# Patient Record
Sex: FEMALE | Race: WHITE
[De-identification: names, ages, dates, MRNs, and addresses within clinical notes are randomized per-mention and may not be internally consistent; named-entity substitution may affect disease eponyms.]

---

## 2019-08-23 ENCOUNTER — HOSPITAL ENCOUNTER (OUTPATIENT)
Dept: HOSPITAL 11 - JP.SDS | Age: 69
Discharge: HOME | End: 2019-08-23
Attending: SURGERY
Payer: MEDICARE

## 2019-08-23 DIAGNOSIS — K57.30: ICD-10-CM

## 2019-08-23 DIAGNOSIS — D12.8: ICD-10-CM

## 2019-08-23 DIAGNOSIS — I10: ICD-10-CM

## 2019-08-23 DIAGNOSIS — F32.9: ICD-10-CM

## 2019-08-23 DIAGNOSIS — Z88.1: ICD-10-CM

## 2019-08-23 DIAGNOSIS — I47.1: ICD-10-CM

## 2019-08-23 DIAGNOSIS — Z12.11: Primary | ICD-10-CM

## 2019-08-23 DIAGNOSIS — Z86.010: ICD-10-CM

## 2019-08-23 DIAGNOSIS — Z80.0: ICD-10-CM

## 2019-08-23 DIAGNOSIS — K21.9: ICD-10-CM

## 2019-08-23 PROCEDURE — 45380 COLONOSCOPY AND BIOPSY: CPT

## 2019-08-23 NOTE — OR
DATE OF PROCEDURE:  08/23/2019

 

PREOPERATIVE DIAGNOSIS:  History of precancerous polyps, strong family history of colon

cancer, brother had colon cancer.

 

POSTOPERATIVE DIAGNOSES:  Small distal rectal polyp, diverticulosis, personal history of

precancerous polyps, brother had colon cancer.

 

PROCEDURE:  Colonoscopy to the cecum with biopsy resection of small distal rectal polyp.

 

SURGEON:  Sathya Nice MD

 

ANESTHESIA:  IV anesthesia with monitored anesthesia care.

 

INDICATION:  This 69-year-old white female is referred for a colonoscopy.  She has a

personal history of precancerous polyps.  She has a strong family history of colon cancer in

that her brother had colon cancer.  Her last colonoscopic exam she says was done 3 years

ago.  I counseled her for the procedure, including risks and alternatives, and she gave her

informed consent to proceed.

 

DESCRIPTION OF PROCEDURE:  The patient was placed in the left lateral decubitus position.

IV anesthesia was administered by the Anesthesia Service.  Time-out was held.  A rectal exam

was performed, which was unremarkable.  The flexible video Olympus colonoscope was

introduced through her anus, up her rectum and out her colon, all the way to the cecum.  En

route, we saw a few scattered left-sided diverticula.  There was no bleeding or inflammation

associated with them.  Once the cecum was reached, the scope was slowly withdrawn examining

the mucosa throughout.  No additional mucosal abnormalities were noted until we reached the

rectum.  Here, when we retroflexed the scope, in the distal rectum, we saw a small polyp.

This was removed with several bites of the biopsy forceps.  The scope was straightened and

removed.  She tolerated the procedure well.

 

 

 

 

Sathya Nice MD

DD:  08/23/2019 09:05:37

DT:  08/23/2019 10:54:50

Job #:  [id number]

## 2020-03-07 ENCOUNTER — HOSPITAL ENCOUNTER (EMERGENCY)
Dept: HOSPITAL 11 - JP.ED | Age: 70
Discharge: HOME | End: 2020-03-07
Payer: MEDICARE

## 2020-03-07 DIAGNOSIS — Z88.1: ICD-10-CM

## 2020-03-07 DIAGNOSIS — Z79.899: ICD-10-CM

## 2020-03-07 DIAGNOSIS — J44.9: ICD-10-CM

## 2020-03-07 DIAGNOSIS — M25.462: Primary | ICD-10-CM

## 2020-03-07 DIAGNOSIS — I10: ICD-10-CM

## 2020-03-07 NOTE — EDM.PDOC
ED HPI GENERAL MEDICAL PROBLEM





- General


Chief Complaint: Lower Extremity Injury/Pain


Stated Complaint: LEFT KNEE PAIN  DIFFICULT TO STAND


Time Seen by Provider: 03/07/20 10:20


Source of Information: Reports: Patient, Old Records, RN


History Limitations: Reports: No Limitations





- History of Present Illness


INITIAL COMMENTS - FREE TEXT/NARRATIVE: 





68 yo female presents with L knee pain and swelling. Problem ongoing for about 

a week. No injury. No fever. Had problems similarly about a year ago and was tx'

d in the clinic with a steroid injection. Has not been to the clinic lately. Is 

on acetaminophen and diclofenac currently for arthritis. 


Onset: Gradual


Onset Date: 02/29/20


Duration: Week(s): (1), Getting Worse


Location: Reports: Lower Extremity, Left


Quality: Reports: Dull


Severity: Moderate


Improves with: Reports: Rest


Worsens with: Reports: Movement


Context: Reports: Other (see HPI)


Associated Symptoms: Reports: No Other Symptoms


Treatments PTA: Reports: Acetaminophen, NSAIDS


  ** Left Knee


Pain Score (Numeric/FACES): 5





- Related Data


 Allergies











Allergy/AdvReac Type Severity Reaction Status Date / Time


 


azithromycin [From Zithromax] Allergy  Other Verified 03/07/20 10:19


 


cefdinir AdvReac  Diarrhea Verified 03/07/20 10:19











Home Meds: 


 Home Meds





Acetaminophen [Acetaminophen 8 Hour] 650 mg PO Q8HR PRN 08/22/16 [History]


Ascorbic Acid [Vitamin C] 1,000 mg PO DAILY 08/22/16 [History]


Calcium Citrate/Vitamin D3 [Calcium Cit-Vit D 315-200] 1 tab PO DAILY 08/22/16 [

History]


Cholecalciferol (Vitamin D3) [Vitamin D3] 5,000 units PO DAILY 08/22/16 [History

]


Omega-3/DHA/Epa/Fish Oil [Omega-3 Fish Oil 1,000 MG Sfgl] 1,000 mg PO DAILY 08/ 22/16 [History]


Vitamin A 10,000 units PO DAILY 08/22/16 [History]


Vitamin E 400 units PO DAILY 08/22/16 [History]


Albuterol Sulfate [Proair Hfa] 1 - 2 puff IH Q4H PRN 08/21/19 [History]


Alendronate Sodium [Fosamax] 70 mg PO .WEEKLY 08/21/19 [History]


Diclofenac Sodium [Voltaren] 1 applic TOP QID 08/21/19 [History]


Diclofenac Sodium [Voltaren] 75 mg PO BIDMEALS 08/21/19 [History]


Thiamine [Vitamin B-1] 100 mg PO BEDTIME 08/21/19 [History]


hydroCHLOROthiazide [Hydrochlorothiazide] 25 mg PO DAILY 08/21/19 [History]











Past Medical History


HEENT History: Reports: Hard of Hearing, Impaired Vision, Other (See Below)


Other HEENT History: endothelial corneal sclerosis,


Cardiovascular History: Reports: Hypertension, Other (See Below)


Other Cardiovascular History: paroxysmal supraventricular tachycardia


Respiratory History: Reports: Bronchitis, Recurrent, COPD, Other (See Below)


Other Respiratory History: hemoptysis hx of.  three nodes in lungs


Gastrointestinal History: Reports: Colon Polyp


Genitourinary History: Reports: Urinary Incontinence


OB/GYN History: Reports: Pregnancy


Musculoskeletal History: Reports: Osteoporosis, RA


Psychiatric History: Reports: Depression


Hematologic History: Reports: Blood Transfusion(s)





- Infectious Disease History


Infectious Disease History: Reports: Chicken Pox, Measles, Mumps, Scarlet Fever

, Other (See Below)


Other Infectious Disease History: JAUNDICE





- Past Surgical History


HEENT Surgical History: Reports: Oral Surgery


Cardiovascular Surgical History: Reports: Cardiac Ablation, Other (See Below)


Respiratory Surgical History: Reports: Other (See Below)


Other Respiratory Surgeries/Procedures: STEM CELL THERAPY ON LUNGS


GI Surgical History: Reports: Appendectomy, Colonoscopy


Female  Surgical History: Reports: Hysterectomy


Endocrine Surgical History: Reports: None


Neurological Surgical History: Reports: None


Musculoskeletal Surgical History: Reports: None





Social & Family History





- Family History


Family Medical History: Noncontributory





- Caffeine Use


Caffeine Use: Reports: None





Review of Systems





- Review of Systems


Review Of Systems: See Below


Constitutional: Reports: No Symptoms


Musculoskeletal: Reports: Joint Pain (L knee), Joint Swelling (L knee)


Skin: Reports: No Symptoms


Neurological: Reports: No Symptoms





ED EXAM, GENERAL





- Physical Exam


Exam: See Below


Exam Limited By: No Limitations


General Appearance: Alert, WD/WN, No Apparent Distress


Extremities: Pedal Edema (L knee effusion present), Other (No ligamentous 

laxity. No jt line tenderness. Baker's cyst present. ).  No: No Pedal Edema, 

Increased Warmth, Redness


Neurological: Alert, Oriented, CN II-XII Intact, Normal Cognition, No Motor/

Sensory Deficits





Course





- Vital Signs


Last Recorded V/S: 





 Last Vital Signs











Temp  35.9 C L  03/07/20 10:20


 


Pulse  75   03/07/20 10:20


 


Resp  18   03/07/20 10:20


 


BP  180/64 H  03/07/20 10:20


 


Pulse Ox  94 L  03/07/20 10:20














Departure





- Departure


Time of Disposition: 10:45


Disposition: Home, Self-Care 01


Condition: Fair


Clinical Impression: 


 Effusion, left knee








- Discharge Information


*PRESCRIPTION DRUG MONITORING PROGRAM REVIEWED*: No


*COPY OF PRESCRIPTION DRUG MONITORING REPORT IN PATIENT MARILYN: No


Referrals: 


Roselyn Jeffries MD [Primary Care Provider] - 


Additional Instructions: 


Use a walker to take weight off your left knee. Continue your current 

medications. You may continue use of the ACE wrap. Someone will call you next 

week regarding an appt to see orthopedics about your knee. 





Sepsis Event Note





- Focused Exam


Vital Signs: 





 Vital Signs











  Temp Pulse Resp BP Pulse Ox


 


 03/07/20 10:20  35.9 C L  75  18  180/64 H  94 L











Date Exam was Performed: 03/07/20


Time Exam was Performed: 10:30

## 2021-03-01 ENCOUNTER — HOSPITAL ENCOUNTER (EMERGENCY)
Dept: HOSPITAL 11 - JP.ED | Age: 71
Discharge: HOME | End: 2021-03-01
Payer: MEDICARE

## 2021-03-01 DIAGNOSIS — Z79.899: ICD-10-CM

## 2021-03-01 DIAGNOSIS — Z88.1: ICD-10-CM

## 2021-03-01 DIAGNOSIS — K29.00: Primary | ICD-10-CM

## 2021-03-01 DIAGNOSIS — I10: ICD-10-CM

## 2021-03-01 DIAGNOSIS — J44.9: ICD-10-CM

## 2021-03-01 PROCEDURE — 83690 ASSAY OF LIPASE: CPT

## 2021-03-01 PROCEDURE — 84450 TRANSFERASE (AST) (SGOT): CPT

## 2021-03-01 PROCEDURE — 99284 EMERGENCY DEPT VISIT MOD MDM: CPT

## 2021-03-01 PROCEDURE — 86140 C-REACTIVE PROTEIN: CPT

## 2021-03-01 PROCEDURE — 82272 OCCULT BLD FECES 1-3 TESTS: CPT

## 2021-03-01 PROCEDURE — 99283 EMERGENCY DEPT VISIT LOW MDM: CPT

## 2021-03-01 PROCEDURE — 76705 ECHO EXAM OF ABDOMEN: CPT

## 2021-03-01 PROCEDURE — 80048 BASIC METABOLIC PNL TOTAL CA: CPT

## 2021-03-01 PROCEDURE — 36415 COLL VENOUS BLD VENIPUNCTURE: CPT

## 2021-03-01 PROCEDURE — 93005 ELECTROCARDIOGRAM TRACING: CPT

## 2021-03-01 PROCEDURE — 85027 COMPLETE CBC AUTOMATED: CPT

## 2021-03-01 NOTE — EDM.PDOC
ED HPI GENERAL MEDICAL PROBLEM





- General


Chief Complaint: General


Stated Complaint: ABD PAIN WITH SOB


Time Seen by Provider: 03/01/21 11:50


Source of Information: Reports: Patient, Old Records, RN


History Limitations: Reports: No Limitations





- History of Present Illness


INITIAL COMMENTS - FREE TEXT/NARRATIVE: 





71 yo female here with a few days of constant epigastric pain. Pain is worse 

with pressing on the area or with taking a deep breath. Has been taking 

acetaminophen for the pain with partial relief. No black or bloody stools. Does 

take diclofenac for arthritis. No nausea, vomiting or fever. Does not drink 

ETOH. Is slightly dizzy with standing at times. 


Onset: Gradual


Onset Date: 02/27/21


Duration: Day(s): (2-3), Constant


Location: Reports: Abdomen


Quality: Reports: Burning


Severity: Moderate


Improves with: Reports: Medication (Tylenol)


Worsens with: Reports: Other (deep breathing)


Context: Reports: Other (See HPI)


Associated Symptoms: Denies: Chest Pain, Nausea/Vomiting


Treatments PTA: Reports: Acetaminophen


  ** Epigastric


Pain Score (Numeric/FACES): 8





- Related Data


                                    Allergies











Allergy/AdvReac Type Severity Reaction Status Date / Time


 


azithromycin [From Zithromax] Allergy  Other Verified 03/07/20 10:19


 


cefdinir AdvReac  Diarrhea Verified 03/07/20 10:19











Home Meds: 


                                    Home Meds





Acetaminophen [Acetaminophen 8 Hour] 650 mg PO Q8HR PRN 08/22/16 [History]


Ascorbic Acid [Vitamin C] 1,000 mg PO DAILY 08/22/16 [History]


Calcium Citrate/Vitamin D3 [Calcium Cit-Vit D 315-200] 1 tab PO DAILY 08/22/16 

[History]


Cholecalciferol (Vitamin D3) [Vitamin D3] 5,000 units PO DAILY 08/22/16 [Hist

ory]


Omega-3/DHA/Epa/Fish Oil [Omega-3 Fish Oil 1,000 MG Sfgl] 1,000 mg PO DAILY 

08/22/16 [History]


Vitamin A 10,000 units PO DAILY 08/22/16 [History]


Vitamin E 400 units PO DAILY 08/22/16 [History]


Albuterol Sulfate [Proair Hfa] 1 - 2 puff IH Q4H PRN 08/21/19 [History]


Alendronate Sodium [Fosamax] 70 mg PO .WEEKLY 08/21/19 [History]


Diclofenac Sodium [Voltaren] 1 applic TOP QID 08/21/19 [History]


Diclofenac Sodium [Voltaren] 75 mg PO BIDMEALS 08/21/19 [History]


Thiamine [Vitamin B-1] 100 mg PO BEDTIME 08/21/19 [History]


hydroCHLOROthiazide [Hydrochlorothiazide] 25 mg PO DAILY 08/21/19 [History]


Hydroxychloroquine Sulfate [Plaquenil] 100 mg PO DAILY 05/26/20 [History]


Tiotropium [Spiriva HandiHaler] 1 puff INH DAILY 05/26/20 [History]


Acetaminophen/oxyCODONE [Percocet 325-5 MG] 1 each PO QID PRN #12 tab 03/01/21 

[Rx]


Famotidine 20 mg PO BEDTIME #30 tablet 03/01/21 [Rx]


lisinopriL [Lisinopril] 20 mg PO DAILY #30 tablet 03/01/21 [Rx]











Past Medical History


HEENT History: Reports: Hard of Hearing, Impaired Vision, Other (See Below)


Other HEENT History: endothelial corneal sclerosis,


Cardiovascular History: Reports: Hypertension, Other (See Below)


Other Cardiovascular History: paroxysmal supraventricular tachycardia


Respiratory History: Reports: Bronchitis, Recurrent, COPD, Other (See Below)


Other Respiratory History: hemoptysis hx of.  three nodes in lungs


Gastrointestinal History: Reports: Colon Polyp


Genitourinary History: Reports: Urinary Incontinence


OB/GYN History: Reports: Pregnancy


Musculoskeletal History: Reports: Osteoporosis, RA


Other Musculoskeletal History: L knee pain


Psychiatric History: Reports: Depression


Hematologic History: Reports: Blood Transfusion(s)





- Infectious Disease History


Infectious Disease History: Reports: Chicken Pox, Measles, Mumps, Scarlet Fever,

 Other (See Below)


Other Infectious Disease History: JAUNDICE





- Past Surgical History


Head Surgeries/Procedures: Reports: None


HEENT Surgical History: Reports: Oral Surgery


Cardiovascular Surgical History: Reports: Cardiac Ablation, Other (See Below)


Other Cardiovascular Surgeries/Procedures: ablation in 2001


Respiratory Surgical History: Reports: Other (See Below)


Other Respiratory Surgeries/Procedures: STEM CELL THERAPY ON LUNGS


GI Surgical History: Reports: Appendectomy, Colonoscopy


Female  Surgical History: Reports: Hysterectomy


Endocrine Surgical History: Reports: None


Neurological Surgical History: Reports: None


Musculoskeletal Surgical History: Reports: None


Dermatological Surgical History: Reports: None





Social & Family History





- Family History


Family Medical History: No Pertinent Family History





- Tobacco Use


Tobacco Use Status *Q: Former Tobacco User


Used Tobacco, but Quit: Yes


Month/Year Tobacco Last Used: years ago





- Caffeine Use


Caffeine Use: Reports: Coffee





- Recreational Drug Use


Recreational Drug Use: No





ED ROS GENERAL





- Review of Systems


Review Of Systems: See Below


Constitutional: Reports: No Symptoms


HEENT: Reports: No Symptoms


Respiratory: Reports: No Symptoms


Cardiovascular: Reports: Lightheadedness (at times)


Endocrine: Reports: No Symptoms


GI/Abdominal: Reports: Abdominal Pain (epigastric).  Denies: Black Stool, Bloody

 Stool, Constipation, Diarrhea, Hematemesis, Hematochezia, Melena, Nausea, 

Vomiting


: Reports: No Symptoms


Musculoskeletal: Reports: No Symptoms


Skin: Reports: No Symptoms


Neurological: Reports: No Symptoms





ED EXAM, GENERAL





- Physical Exam


Exam: See Below


Exam Limited By: No Limitations


General Appearance: Alert, WD/WN, No Apparent Distress


Eye Exam: Bilateral Eye: Normal Inspection


Ears: Normal External Exam, Normal Canal, Hearing Loss (mild)


Ear Exam: Bilateral Ear: Auricle Normal, Canal Normal


Nose: Normal Inspection, No Blood


Throat/Mouth: Normal Inspection, Normal Lips, Normal Oropharynx, Normal Voice, 

No Airway Compromise


Head: Atraumatic, Normocephalic


Neck: Normal Inspection, Non-Tender


Respiratory/Chest: No Respiratory Distress, Lungs Clear, Normal Breath Sounds, 

No Accessory Muscle Use


Cardiovascular: Regular Rate, Rhythm, No Edema


GI/Abdominal: Normal Bowel Sounds, Soft, No Distention, Tender (epigastrium 

only).  No: Non-Tender, Distended


Back Exam: Normal Inspection


Extremities: Normal Inspection, Normal Range of Motion, Non-Tender, No Pedal 

Edema


Neurological: Alert, Oriented, CN II-XII Intact, Normal Cognition, No 

Motor/Sensory Deficits


Psychiatric: Normal Affect, Normal Mood


Skin Exam: Warm, Dry, Intact, Normal Color, No Rash


  ** #1 Interpretation


EKG Date: 03/01/21


Time: 11:40


Rhythm: NSR


Rate (Beats/Min): 82


Axis: Normal


P-Wave: Present


QRS: LBBB (L anterior fascicular block)


ST-T: Normal


QT: Normal


Comparison: NA - No Prior EKG





Course





- Vital Signs


Last Recorded V/S: 


                                Last Vital Signs











Temp  36.2 C   03/01/21 11:36


 


Pulse  97   03/01/21 11:36


 


Resp  18   03/01/21 11:36


 


BP  200/92 H  03/01/21 13:17


 


Pulse Ox  92 L  03/01/21 11:36








                                        





Orthostatic Blood Pressure [     200/92


Standing]                        


Orthostatic Blood Pressure [     207/93


Sitting]                         


Orthostatic Blood Pressure [     206/97


Supine]                          











- Orders/Labs/Meds


Orders: 


                               Active Orders 24 hr











 Category Date Time Status


 


 EKG Documentation Completion [RC] ASDIRECTED Care  03/01/21 11:46 Active


 


 Orthostatic Vital Signs [RC] ASDIRECTED Care  03/01/21 12:44 Active


 


 Abdomen Ltd [US] Stat Exams  03/01/21 13:07 Ordered


 


 Hemoccult [OCCULT BLOOD DIAGNOSTIC] [OP] Stat Lab  03/01/21 12:44 Ordered


 


 lisinopriL [Prinivil] Med  03/01/21 13:00 Active





 20 mg PO DAILY   


 


 EKG 12 Lead [EK] Routine Ther  03/01/21 11:46 Ordered








                                Medication Orders





Lisinopril (Prinivil)  20 mg PO DAILY CHANCE


   Last Admin: 03/01/21 13:17  Dose: 20 mg


   Documented by: MIKE








Labs: 


                                Laboratory Tests











  03/01/21 03/01/21 03/01/21 Range/Units





  12:07 12:07 12:48 


 


WBC  5.7    (4.5-11.0)  K/uL


 


RBC  4.65    (3.30-5.50)  M/uL


 


Hgb  12.3    (12.0-15.0)  g/dL


 


Hct  38.4    (36.0-48.0)  %


 


MCV  83    (80-98)  fL


 


MCH  27    (27-31)  pg


 


MCHC  32    (32-36)  %


 


Plt Count  267    (150-400)  K/uL


 


Sodium   137 L   (140-148)  mmol/L


 


Potassium   3.4 L   (3.6-5.2)  mmol/L


 


Chloride   100   (100-108)  mmol/L


 


Carbon Dioxide   25   (21-32)  mmol/L


 


Anion Gap   15.4 H   (5.0-14.0)  mmol/L


 


BUN   11   (7-18)  mg/dL


 


Creatinine   0.7   (0.6-1.0)  mg/dL


 


Est Cr Clr Drug Dosing   60.09   mL/min


 


Estimated GFR (MDRD)   > 60   (>60)  


 


Glucose   94   ()  mg/dL


 


Calcium   9.1   (8.5-10.1)  mg/dL


 


AST    27  (15-37)  U/L


 


C-Reactive Protein    2.95 H  (0.0-0.3)  mg/dL


 


Lipase   56 L   ()  U/L











Meds: 


Medications











Generic Name Dose Route Start Last Admin





  Trade Name Babak  PRN Reason Stop Dose Admin


 


Lisinopril  20 mg  03/01/21 13:00  03/01/21 13:17





  Prinivil  PO   20 mg





  DAILY CHANCE   Administration














Discontinued Medications














Generic Name Dose Route Start Last Admin





  Trade Name Speedyq  PRN Reason Stop Dose Admin


 


Al Hydroxide/Mg Hydroxide 15  0 ml  03/01/21 11:57  03/01/21 12:01





  ml/ Lidocaine HCl 15 ml  PO  03/01/21 11:58  30 ml





  ONETIME ONE   Administration


 


Oxycodone/Acetaminophen  1 tab  03/01/21 12:42  03/01/21 12:50





  Percocet 325-5 Mg  PO  03/01/21 12:43  1 tab





  ONETIME STA   Administration














- Radiology Interpretation


Free Text/Narrative:: 





GB ultrasound-no pathology noted





- Re-Assessments/Exams


Free Text/Narrative Re-Assessment/Exam: 





03/01/21 12:50


No significant change with GI cocktail po





Departure





- Departure


Time of Disposition: 13:40


Disposition: Home, Self-Care 01


Condition: Fair


Clinical Impression: 


Gastritis


Qualifiers:


 Gastritis type: other gastritis Chronicity: acute Gastritis bleeding: without 

bleeding Qualified Code(s): K29.00 - Acute gastritis without bleeding





HTN (hypertension)


Qualifiers:


 Hypertension type: unspecified Qualified Code(s): I10 - Essential (primary) 

hypertension








- Discharge Information


*PRESCRIPTION DRUG MONITORING PROGRAM REVIEWED*: No


*COPY OF PRESCRIPTION DRUG MONITORING REPORT IN PATIENT MARILYN: No


Instructions:  Gastritis, Adult, Easy-to-Read


Referrals: 


Roselyn Jeffries MD [Primary Care Provider] - 


Forms:  ED Department Discharge


Additional Instructions: 


Use famotidine every day at bedtime starting tonight. Use either acetaminophen 

OR Percocet for your stomach pain and/or your arthritis pain. Take lisinopril 

daily starting tomorrow morning. Recheck with your provider later this week. R

eturn if worse. 





Sepsis Event Note (ED)





- Evaluation


Sepsis Screening Result: No Definite Risk





- Focused Exam


Vital Signs: 


                                   Vital Signs











  Temp Pulse Resp BP BP Pulse Ox


 


 03/01/21 13:17     200/92 H  


 


 03/01/21 11:36  36.2 C  97  18   218/107 H  92 L














- My Orders


Last 24 Hours: 


My Active Orders





03/01/21 11:46


EKG Documentation Completion [RC] ASDIRECTED 


EKG 12 Lead [EK] Routine 





03/01/21 12:44


Orthostatic Vital Signs [RC] ASDIRECTED 


Hemoccult [OCCULT BLOOD DIAGNOSTIC] [OP] Stat 





03/01/21 13:00


lisinopriL [Prinivil]   20 mg PO DAILY 





03/01/21 13:07


Abdomen Ltd [US] Stat 














- Assessment/Plan


Last 24 Hours: 


My Active Orders





03/01/21 11:46


EKG Documentation Completion [RC] ASDIRECTED 


EKG 12 Lead [EK] Routine 





03/01/21 12:44


Orthostatic Vital Signs [RC] ASDIRECTED 


Hemoccult [OCCULT BLOOD DIAGNOSTIC] [OP] Stat 





03/01/21 13:00


lisinopriL [Prinivil]   20 mg PO DAILY 





03/01/21 13:07


Abdomen Ltd [US] Stat

## 2021-03-01 NOTE — US
Abdomen Ltd

 

CLINICAL HISTORY: Epigastric pain

 

COMPARISON: None.

 

TECHNIQUE: Real-time images were obtained through the right upper quadrant.

 

FINDINGS: The liver is free of mass or biliary dilatation. There is a normal

hepatic echo texture. The gallbladder has normal appearance. The common bile

duct measures 3 mm. The pancreas is within normal limits as seen. The right

kidney has a normal appearance. The IVC is normal.

 

IMPRESSION: Essentially negative right upper quadrant ultrasound

## 2021-06-16 ENCOUNTER — HOSPITAL ENCOUNTER (EMERGENCY)
Dept: HOSPITAL 11 - JP.ED | Age: 71
Discharge: HOME | End: 2021-06-16
Payer: MEDICARE

## 2021-06-16 DIAGNOSIS — I10: ICD-10-CM

## 2021-06-16 DIAGNOSIS — Z88.1: ICD-10-CM

## 2021-06-16 DIAGNOSIS — Z79.899: ICD-10-CM

## 2021-06-16 DIAGNOSIS — Z88.8: ICD-10-CM

## 2021-06-16 DIAGNOSIS — Z87.891: ICD-10-CM

## 2021-06-16 DIAGNOSIS — I16.0: Primary | ICD-10-CM

## 2021-06-16 DIAGNOSIS — I48.91: ICD-10-CM

## 2021-06-16 DIAGNOSIS — J44.9: ICD-10-CM

## 2021-06-16 PROCEDURE — 99283 EMERGENCY DEPT VISIT LOW MDM: CPT

## 2021-06-16 PROCEDURE — 85025 COMPLETE CBC W/AUTO DIFF WBC: CPT

## 2021-06-16 PROCEDURE — 36415 COLL VENOUS BLD VENIPUNCTURE: CPT

## 2021-06-16 PROCEDURE — 80048 BASIC METABOLIC PNL TOTAL CA: CPT

## 2021-06-16 NOTE — EDM.PDOC
ED HPI GENERAL MEDICAL PROBLEM





- General


Chief Complaint: Cardiovascular Problem


Stated Complaint: BLOOD PRESSURE HIGH


Time Seen by Provider: 06/16/21 12:51


Source of Information: Reports: Patient, Old Records, RN Notes Reviewed


History Limitations: Reports: No Limitations





- History of Present Illness


INITIAL COMMENTS - FREE TEXT/NARRATIVE: 





71-year-old female presents emergency department day complaint of elevated blood

pressure not feeling well.  She states her blood pressure was fine in the clinic

last week systolic 125 she does admit to being a lot of stress with her hus

band's health she did call to the clinic today and they recommended she report 

to the emergency department.  She has no specific symptoms nausea vomiting 

shortness of breath chest pain no diaphoresis





- Related Data


                                    Allergies











Allergy/AdvReac Type Severity Reaction Status Date / Time


 


azithromycin [From Zithromax] Allergy  Other Verified 06/16/21 12:46


 


prednisone Allergy  Confusion Verified 06/16/21 13:05


 


cefdinir AdvReac  Diarrhea Verified 06/16/21 12:46











Home Meds: 


                                    Home Meds





Acetaminophen [Acetaminophen 8 Hour] 650 mg PO Q8HR PRN 08/22/16 [History]


Ascorbic Acid [Vitamin C] 1,000 mg PO DAILY 08/22/16 [History]


Calcium Citrate/Vitamin D3 [Calcium Cit-Vit D 315-200] 1 tab PO DAILY 08/22/16 

[History]


Cholecalciferol (Vitamin D3) [Vitamin D3] 5,000 units PO DAILY 08/22/16 

[History]


Omega-3/DHA/Epa/Fish Oil [Omega-3 Fish Oil 1,000 MG Sfgl] 1,000 mg PO DAILY 

08/22/16 [History]


Vitamin A 10,000 units PO DAILY 08/22/16 [History]


Vitamin E 400 units PO DAILY 08/22/16 [History]


Albuterol Sulfate [Proair Hfa] 1 - 2 puff IH Q4H PRN 08/21/19 [History]


Alendronate Sodium [Fosamax] 70 mg PO .WEEKLY 08/21/19 [History]


Diclofenac Sodium [Voltaren] 1 applic TOP QID 08/21/19 [History]


Thiamine [Vitamin B-1] 100 mg PO BEDTIME 08/21/19 [History]


hydroCHLOROthiazide [Hydrochlorothiazide] 25 mg PO DAILY 08/21/19 [History]


Hydroxychloroquine Sulfate [Plaquenil] 100 mg PO DAILY 05/26/20 [History]


Tiotropium [Spiriva HandiHaler] 1 puff INH DAILY 05/26/20 [History]











Past Medical History


HEENT History: Reports: Hard of Hearing, Impaired Vision, Other (See Below)


Other HEENT History: endothelial corneal sclerosis,


Cardiovascular History: Reports: Afib, Hypertension, Other (See Below)


Other Cardiovascular History: paroxysmal supraventricular tachycardia


Respiratory History: Reports: Bronchitis, Recurrent, COPD, Other (See Below)


Other Respiratory History: hemoptysis hx of.  three nodes in lungs


Gastrointestinal History: Reports: Colon Polyp


Genitourinary History: Reports: Urinary Incontinence


OB/GYN History: Reports: Pregnancy


Musculoskeletal History: Reports: Osteoporosis, RA


Other Musculoskeletal History: L knee pain


Psychiatric History: Reports: Depression


Hematologic History: Reports: Blood Transfusion(s)





- Infectious Disease History


Infectious Disease History: Reports: Chicken Pox, Measles, Mumps, Scarlet Fever,

 Other (See Below)


Other Infectious Disease History: JAUNDICE.  COVID VACC  IN APRIL 2021





- Past Surgical History


Head Surgeries/Procedures: Reports: None


HEENT Surgical History: Reports: Oral Surgery


Cardiovascular Surgical History: Reports: Cardiac Ablation, Other (See Below)


Other Cardiovascular Surgeries/Procedures: ablation in 2001


Respiratory Surgical History: Reports: Other (See Below)


Other Respiratory Surgeries/Procedures: STEM CELL THERAPY ON LUNGS


GI Surgical History: Reports: Appendectomy, Colonoscopy


Female  Surgical History: Reports: Hysterectomy


Endocrine Surgical History: Reports: None


Neurological Surgical History: Reports: None


Musculoskeletal Surgical History: Reports: None


Dermatological Surgical History: Reports: None





Social & Family History





- Family History


Family Medical History: No Pertinent Family History





- Tobacco Use


Tobacco Use Status *Q: Former Tobacco User


Used Tobacco, but Quit: Yes


Month/Year Tobacco Last Used: 2000





- Caffeine Use


Caffeine Use: Reports: Coffee





- Recreational Drug Use


Recreational Drug Use: No





ED ROS GENERAL





- Review of Systems


Review Of Systems: See Below


Constitutional: Reports: Other (Not feeling well)


HEENT: Reports: No Symptoms


Respiratory: Reports: No Symptoms


Cardiovascular: Reports: No Symptoms


GI/Abdominal: Reports: No Symptoms





ED EXAM, GENERAL





- Physical Exam


Exam: See Below


Exam Limited By: No Limitations


General Appearance: Alert, WD/WN, No Apparent Distress


Respiratory/Chest: No Respiratory Distress, Lungs Clear, Normal Breath Sounds, 

No Accessory Muscle Use, Chest Non-Tender


Cardiovascular: Regular Rate, Rhythm, No Murmur


GI/Abdominal: Soft, Non-Tender





Course





- Vital Signs


Last Recorded V/S: 


                                Last Vital Signs











Temp  97.5 F   06/16/21 12:36


 


Pulse  79   06/16/21 14:22


 


Resp  18   06/16/21 14:22


 


BP  157/74 H  06/16/21 14:22


 


Pulse Ox  93 L  06/16/21 14:22














- Orders/Labs/Meds


Labs: 


                                Laboratory Tests











  06/16/21 06/16/21 Range/Units





  14:15 14:15 


 


WBC  4.8   (4.5-11.0)  K/uL


 


RBC  4.39   (3.30-5.50)  M/uL


 


Hgb  11.5 L   (12.0-15.0)  g/dL


 


Hct  36.6   (36.0-48.0)  %


 


MCV  83   (80-98)  fL


 


MCH  26 L   (27-31)  pg


 


MCHC  31 L   (32-36)  %


 


Plt Count  262   (150-400)  K/uL


 


Neut % (Auto)  69.5 H   (36-66)  %


 


Lymph % (Auto)  16.1 L   (24-44)  %


 


Mono % (Auto)  12.2 H   (2-6)  %


 


Eos % (Auto)  1.4 L   (2-4)  %


 


Baso % (Auto)  0.8   (0-1)  %


 


Sodium   139 L  (140-148)  mmol/L


 


Potassium   3.4 L  (3.6-5.2)  mmol/L


 


Chloride   101  (100-108)  mmol/L


 


Carbon Dioxide   24  (21-32)  mmol/L


 


Anion Gap   17.4 H  (5.0-14.0)  mmol/L


 


BUN   15  (7-18)  mg/dL


 


Creatinine   0.6  (0.6-1.0)  mg/dL


 


Est Cr Clr Drug Dosing   TNP  


 


Estimated GFR (MDRD)   > 60  (>60)  


 


Glucose   101  ()  mg/dL


 


Calcium   8.9  (8.5-10.1)  mg/dL











Meds: 


Medications














Discontinued Medications














Generic Name Dose Route Start Last Admin





  Trade Name Freq  PRN Reason Stop Dose Admin


 


Lisinopril  10 mg  06/16/21 14:04  06/16/21 14:21





  Lisinopril 10 Mg Tab  PO  06/16/21 14:05  10 mg





  ONETIME ONE   Administration


 


Lorazepam  0.5 mg  06/16/21 13:01  06/16/21 13:08





  Lorazepam 0.5 Mg Tab  PO  06/16/21 13:02  0.5 mg





  ONETIME ONE   Administration














Departure





- Departure


Time of Disposition: 15:07


Disposition: Home, Self-Care 01


Condition: Fair


Clinical Impression: 


 Hypertensive urgency





Instructions:  Preventing Hypertension


Referrals: 


PCP,None [Primary Care Provider] - 


Forms:  ED Department Discharge


Additional Instructions: 


Continue with your regular medications, please follow-up with your primary care 

in the next 2 to 3 days for reevaluation call return to the emergency department

worsening of symptoms





Sepsis Event Note (ED)





- Evaluation


Sepsis Screening Result: No Definite Risk





- Focused Exam


Vital Signs: 


                                   Vital Signs











  Temp Pulse Resp BP BP Pulse Ox


 


 06/16/21 14:22   79  18   157/74 H  93 L


 


 06/16/21 14:21     157/74 H  


 


 06/16/21 13:58   78  16   191/93 H  94 L


 


 06/16/21 12:36  97.5 F  91  16   210/98 H  94 L














- Assessment/Plan


Plan: 





Assessment





Acuity = acute





Site and laterality = hypertensive urgency





Etiology  = unknown





Manifestations = none





Location of injury =  Home





Lab values = CBC BMP unremarkable





Plan


She had good improvement with combination Ativan and lisinopril did break her 

blood pressure down her symptoms of not feeling well improved.  Because she has 

been normotensive last week I am reluctant to start a new medication today 

instead she is to follow-up with her primary care in the next 2 to 3 days for 

blood pressure reevaluation

















 This note was dictated using dragon voice recognition software please call with

any questions on syntax or grammar.

## 2021-06-20 ENCOUNTER — HOSPITAL ENCOUNTER (EMERGENCY)
Dept: HOSPITAL 11 - JP.ED | Age: 71
Discharge: HOME | End: 2021-06-20
Payer: MEDICARE

## 2021-06-20 DIAGNOSIS — Z79.899: ICD-10-CM

## 2021-06-20 DIAGNOSIS — I10: ICD-10-CM

## 2021-06-20 DIAGNOSIS — I16.0: Primary | ICD-10-CM

## 2021-06-20 DIAGNOSIS — Z88.1: ICD-10-CM

## 2021-06-20 DIAGNOSIS — Z88.8: ICD-10-CM

## 2021-06-20 PROCEDURE — 96366 THER/PROPH/DIAG IV INF ADDON: CPT

## 2021-06-20 PROCEDURE — 36415 COLL VENOUS BLD VENIPUNCTURE: CPT

## 2021-06-20 PROCEDURE — 80048 BASIC METABOLIC PNL TOTAL CA: CPT

## 2021-06-20 PROCEDURE — 99283 EMERGENCY DEPT VISIT LOW MDM: CPT

## 2021-06-20 PROCEDURE — 84484 ASSAY OF TROPONIN QUANT: CPT

## 2021-06-20 PROCEDURE — 85025 COMPLETE CBC W/AUTO DIFF WBC: CPT

## 2021-06-20 PROCEDURE — 96365 THER/PROPH/DIAG IV INF INIT: CPT

## 2021-06-20 NOTE — EDM.PDOC
ED HPI GENERAL MEDICAL PROBLEM





- General


Chief Complaint: Cardiovascular Problem


Stated Complaint: HIGH BP


Time Seen by Provider: 06/20/21 13:27


Source of Information: Reports: Patient, Old Records, RN Notes Reviewed


History Limitations: Reports: No Limitations





- History of Present Illness


INITIAL COMMENTS - FREE TEXT/NARRATIVE: 





71-year-old female presents emergency department day complaint of blood pressure

problem.  I had the opportunity to see her 3 days prior same problem elevated 

blood pressure up in the systolic of 200 we did blood work at that time was 

unrevealing tried lisinopril and Ativan provided good relief blood pressure did 

come down she responded for the next couple of days she said she had normal 

blood pressure in the 120s without any additional medication.  However today 

blood pressure again was up into the 200s systolically she does have follow-up 

appointment with her primary care tomorrow other than feeling uncomfortable with

the blood pressure somewhat unsteady on her feet no chest pain no shortness of 

breath she does have some nausea





- Related Data


                                    Allergies











Allergy/AdvReac Type Severity Reaction Status Date / Time


 


azithromycin [From Zithromax] Allergy  Other Verified 06/20/21 13:04


 


prednisone Allergy  Confusion Verified 06/20/21 13:04


 


cefdinir AdvReac  Diarrhea Verified 06/20/21 13:04











Home Meds: 


                                    Home Meds





Acetaminophen [Acetaminophen 8 Hour] 650 mg PO Q8HR PRN 08/22/16 [History]


Ascorbic Acid [Vitamin C] 1,000 mg PO DAILY 08/22/16 [History]


Cholecalciferol (Vitamin D3) [Vitamin D3] 5,000 units PO DAILY 08/22/16 

[History]


Omega-3/DHA/Epa/Fish Oil [Omega-3 Fish Oil 1,000 MG Sfgl] 2,000 mg PO DAILY 

08/22/16 [History]


Vitamin A 10,000 units PO DAILY 08/22/16 [History]


Vitamin E 400 units PO DAILY 08/22/16 [History]


Albuterol Sulfate [Proair Hfa] 1 - 2 puff IH Q4H PRN 08/21/19 [History]


Alendronate Sodium [Fosamax] 70 mg PO .WEEKLY 08/21/19 [History]


Diclofenac Sodium [Voltaren] 1 applic TOP QID 08/21/19 [History]


Thiamine [Vitamin B-1] 100 mg PO BEDTIME 08/21/19 [History]


hydroCHLOROthiazide [Hydrochlorothiazide] 25 mg PO DAILY 08/21/19 [History]


Hydroxychloroquine Sulfate [Plaquenil] 400 mg PO DAILY 05/26/20 [History]


Tiotropium [Spiriva HandiHaler] 1 puff INH DAILY 05/26/20 [History]


*Tumeric 2 tab PO DAILY 06/20/21 [History]


Calcium Carbonate [Calcium] 500 mg PO DAILY 06/20/21 [History]


Leflunomide 20 mg PO DAILY 06/20/21 [History]


lisinopriL [Prinivil] 10 mg PO DAILY #30 tab 06/20/21 [Rx]











Past Medical History


HEENT History: Reports: Hard of Hearing, Impaired Vision, Other (See Below)


Other HEENT History: endothelial corneal sclerosis,


Cardiovascular History: Reports: Hypertension, Other (See Below), Other (See 

Below)


Other Cardiovascular History: paroxysmal supraventricular tachycardia


Respiratory History: Reports: Bronchitis, Recurrent, COPD, Other (See Below)


Other Respiratory History: hemoptysis hx of.  three nodes in lungs


Gastrointestinal History: Reports: Colon Polyp


Genitourinary History: Reports: Urinary Incontinence


OB/GYN History: Reports: Pregnancy


Musculoskeletal History: Reports: Osteoporosis, RA


Other Musculoskeletal History: L knee pain


Psychiatric History: Reports: Depression


Hematologic History: Reports: Blood Transfusion(s)





- Infectious Disease History


Infectious Disease History: Reports: Chicken Pox, Measles, Mumps, Scarlet Fever,

 Other (See Below)


Other Infectious Disease History: JAUNDICECOVID VACC  IN APRIL 2021





- Past Surgical History


HEENT Surgical History: Reports: Oral Surgery


Cardiovascular Surgical History: Reports: Cardiac Ablation, Other (See Below)


Other Cardiovascular Surgeries/Procedures: ablation in 2001


Respiratory Surgical History: Reports: Other (See Below)


Other Respiratory Surgeries/Procedures: STEM CELL THERAPY ON LUNGS


GI Surgical History: Reports: Appendectomy, Colonoscopy


Female  Surgical History: Reports: Hysterectomy





Social & Family History





- Family History


Family Medical History: No Pertinent Family History





- Tobacco Use


Tobacco Use Status *Q: Never Tobacco User





- Caffeine Use


Caffeine Use: Reports: Coffee





- Recreational Drug Use


Recreational Drug Use: No





ED ROS GENERAL





- Review of Systems


Review Of Systems: See Below


Constitutional: Reports: No Symptoms


Respiratory: Reports: No Symptoms


Cardiovascular: Reports: Lightheadedness


GI/Abdominal: Reports: Nausea





ED EXAM, GENERAL





- Physical Exam


Exam: See Below


Exam Limited By: No Limitations


General Appearance: Alert, WD/WN, No Apparent Distress


Respiratory/Chest: No Respiratory Distress, Lungs Clear, Normal Breath Sounds, 

No Accessory Muscle Use, Chest Non-Tender


Cardiovascular: Regular Rate, Rhythm, No Murmur


GI/Abdominal: Soft, Non-Tender


Extremities: No Pedal Edema





Course





- Vital Signs


Last Recorded V/S: 


                                Last Vital Signs











Temp  97.8 F   06/20/21 13:04


 


Pulse  68   06/20/21 14:12


 


Resp  16   06/20/21 14:12


 


BP  162/78 H  06/20/21 16:50


 


Pulse Ox  94 L  06/20/21 14:12














- Orders/Labs/Meds


Orders: 


                               Active Orders 24 hr











 Category Date Time Status


 


 Peripheral IV Care [RC] .AS DIRECTED Care  06/20/21 14:24 Active


 


 Sodium Chloride 0.9% [Normal Saline] 1,000 ml Med  06/20/21 15:30 Active





 IV ASDIRECTED   


 


 Sodium Chloride 0.9% [Saline Flush] Med  06/20/21 14:24 Active





 10 ml FLUSH ASDIRECTED PRN   


 


 Peripheral IV Insertion Adult [OM.PC] Urgent Oth  06/20/21 14:24 Ordered








                                Medication Orders





Sodium Chloride (Normal Saline)  1,000 mls @ 125 mls/hr IV ASDIRECTED CHANCE


   Last Admin: 06/20/21 15:25  Dose: 125 mls/hr


   Documented by: DUDLEY


Sodium Chloride (Sodium Chloride 0.9% 10 Ml Syringe)  10 ml FLUSH ASDIRECTED PRN


   PRN Reason: Keep Vein Open


   Last Admin: 06/20/21 15:02  Dose: 10 ml


   Documented by: [id number]








Labs: 


                                Laboratory Tests











  06/20/21 06/20/21 06/20/21 Range/Units





  13:35 13:35 13:35 


 


WBC  4.4 L    (4.5-11.0)  K/uL


 


RBC  4.37    (3.30-5.50)  M/uL


 


Hgb  11.7 L    (12.0-15.0)  g/dL


 


Hct  36.8    (36.0-48.0)  %


 


MCV  84    (80-98)  fL


 


MCH  27    (27-31)  pg


 


MCHC  32    (32-36)  %


 


Plt Count  254    (150-400)  K/uL


 


Neut % (Auto)  68.9 H    (36-66)  %


 


Lymph % (Auto)  18.6 L    (24-44)  %


 


Mono % (Auto)  9.5 H    (2-6)  %


 


Eos % (Auto)  2.5    (2-4)  %


 


Baso % (Auto)  0.5    (0-1)  %


 


Sodium    141  (140-148)  mmol/L


 


Potassium    2.9 L*  (3.6-5.2)  mmol/L


 


Chloride    103  (100-108)  mmol/L


 


Carbon Dioxide    23  (21-32)  mmol/L


 


Anion Gap    17.9 H  (5.0-14.0)  mmol/L


 


BUN    15  (7-18)  mg/dL


 


Creatinine    0.6  (0.6-1.0)  mg/dL


 


Est Cr Clr Drug Dosing    72.11  mL/min


 


Estimated GFR (MDRD)    > 60  (>60)  


 


Glucose    94  ()  mg/dL


 


Calcium    8.9  (8.5-10.1)  mg/dL


 


Troponin I   < 0.017   (0.000-0.056)  ng/mL











Meds: 


Medications











Generic Name Dose Route Start Last Admin





  Trade Name Fresmita  PRN Reason Stop Dose Admin


 


Sodium Chloride  1,000 mls @ 125 mls/hr  06/20/21 15:30  06/20/21 15:25





  Normal Saline  IV   125 mls/hr





  ASDIRECTED CHANCE   Administration


 


Sodium Chloride  10 ml  06/20/21 14:24  06/20/21 15:02





  Sodium Chloride 0.9% 10 Ml Syringe  FLUSH   10 ml





  ASDIRECTED PRN   Administration





  Keep Vein Open  














Discontinued Medications














Generic Name Dose Route Start Last Admin





  Trade Name Speedyq  PRN Reason Stop Dose Admin


 


Potassium Chloride 20 meq/  112 mls @ 56 mls/hr  06/20/21 14:55  06/20/21 14:56





Lidocaine HCl 2 ml/ Sodium  IV  06/20/21 16:54  56 mls/hr





Chloride  ONETIME ONE   Administration


 


Lisinopril  10 mg  06/20/21 13:27  06/20/21 13:35





  Lisinopril 10 Mg Tab  PO  06/20/21 13:28  10 mg





  ONETIME ONE   Administration


 


Lorazepam  0.5 mg  06/20/21 13:27  06/20/21 13:35





  Lorazepam 0.5 Mg Tab  PO  06/20/21 13:28  0.5 mg





  ONETIME ONE   Administration


 


Nitroglycerin  0.4 mg  06/20/21 15:21  06/20/21 15:27





  Nitroglycerin 0.4 Mg Tab.Sl  SL  06/20/21 15:22  0.4 mg





  ONETIME ONE   Administration


 


Potassium Chloride  40 meq  06/20/21 14:24  06/20/21 15:01





  Potassium Chloride 20 Meq Tab.Er  PO  06/20/21 14:25  40 meq





  ONETIME ONE   Administration














Departure





- Departure


Time of Disposition: 16:59


Disposition: Home, Self-Care 01


Condition: Fair


Clinical Impression: 


 Hypertensive urgency





Prescriptions: 


lisinopriL [Prinivil] 10 mg PO DAILY #30 tab


Instructions:  Managing Your Hypertension


Referrals: 


Roselyn Jeffries MD [Primary Care Provider] - 


Forms:  ED Department Discharge


Additional Instructions: 


Recommend starting lisinopril 1 tablet daily please keep your follow-up 

appointment with your primary care tomorrow call return to the emergency 

department worsening of symptoms





Sepsis Event Note (ED)





- Evaluation


Sepsis Screening Result: No Definite Risk





- Focused Exam


Vital Signs: 


                                   Vital Signs











  Temp Pulse Resp BP BP Pulse Ox


 


 06/20/21 16:50      162/78 H 


 


 06/20/21 16:08      179/130 H 


 


 06/20/21 15:49      192/96 H 


 


 06/20/21 15:28      216/104 H 


 


 06/20/21 15:27     216/104 H  


 


 06/20/21 15:08      211/107 H 


 


 06/20/21 14:49      189/81 H 


 


 06/20/21 14:12   68  16   221/99 H  94 L


 


 06/20/21 13:35     200/85 H  


 


 06/20/21 13:25   74  16   200/85 H  93 L


 


 06/20/21 13:04  97.8 F  85  16   201/104 H  94 L


 


 06/20/21 13:01  97.8 F  85  16   201/104 H  94 L














- My Orders


Last 24 Hours: 


My Active Orders





06/20/21 14:24


Peripheral IV Care [RC] .AS DIRECTED 


Sodium Chloride 0.9% [Saline Flush]   10 ml FLUSH ASDIRECTED PRN 


Peripheral IV Insertion Adult [OM.PC] Urgent 





06/20/21 15:30


Sodium Chloride 0.9% [Normal Saline] 1,000 ml IV ASDIRECTED 














- Assessment/Plan


Last 24 Hours: 


My Active Orders





06/20/21 14:24


Peripheral IV Care [RC] .AS DIRECTED 


Sodium Chloride 0.9% [Saline Flush]   10 ml FLUSH ASDIRECTED PRN 


Peripheral IV Insertion Adult [OM.PC] Urgent 





06/20/21 15:30


Sodium Chloride 0.9% [Normal Saline] 1,000 ml IV ASDIRECTED 











Plan: 





Assessment





Acuity = acute





Site and laterality = hypertensive urgency with hypokalemia





Etiology  = unknown





Manifestations = none





Location of injury =  Home





Lab values = CBC unremarkable potassium low at 2.9 consistent hypokalemia 

troponin was negative





Plan


She was provided lisinopril in the emergency department which did provide 

improvement to her blood pressure she felt better her potassium was replaced in 

the ED she has a follow-up appointment with her primary care 30 tablets of 

lisinopril was faxed to [facility]

















 This note was dictated using dragon voice recognition software please call with

any questions on syntax or grammar.

## 2021-07-03 ENCOUNTER — HOSPITAL ENCOUNTER (EMERGENCY)
Dept: HOSPITAL 11 - JP.ED | Age: 71
Discharge: HOME | End: 2021-07-03
Payer: MEDICARE

## 2021-07-03 DIAGNOSIS — Z88.1: ICD-10-CM

## 2021-07-03 DIAGNOSIS — I10: Primary | ICD-10-CM

## 2021-07-03 DIAGNOSIS — Z88.8: ICD-10-CM

## 2021-07-03 DIAGNOSIS — J44.9: ICD-10-CM

## 2021-07-03 DIAGNOSIS — Z79.899: ICD-10-CM

## 2021-07-03 DIAGNOSIS — E87.6: ICD-10-CM

## 2021-07-03 PROCEDURE — 99283 EMERGENCY DEPT VISIT LOW MDM: CPT

## 2021-07-03 PROCEDURE — 83735 ASSAY OF MAGNESIUM: CPT

## 2021-07-03 PROCEDURE — 36415 COLL VENOUS BLD VENIPUNCTURE: CPT

## 2021-07-03 PROCEDURE — 84132 ASSAY OF SERUM POTASSIUM: CPT

## 2021-07-03 NOTE — EDM.PDOC
ED HPI GENERAL MEDICAL PROBLEM





- General


Chief Complaint: Cardiovascular Problem


Stated Complaint: HIGH BLOOD PRESSURE


Time Seen by Provider: 07/03/21 14:32


Source of Information: Reports: Patient


History Limitations: Reports: No Limitations





- History of Present Illness


INITIAL COMMENTS - FREE TEXT/NARRATIVE: 





72 yo female presents with general ill feeling.  She was fine this AM and as she

went about doing her housework she began feeling ill.  She then took her BP at 

home and it was 190s/110 this prompted her to present to the ER.  SHe denies 

headache or light headedness.  She has been seen in the ER 3 time in the last 

month for HTN.  She was dx with breast cancer earlier this week.  She is also 

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  She denies CP or SOB, denies dysuria.  She lewis 

shave a hx of hypokalemia and has been trying to increase her potassium intake. 

 





- Related Data


                                    Allergies











Allergy/AdvReac Type Severity Reaction Status Date / Time


 


azithromycin [From Zithromax] Allergy  Other Verified 07/03/21 14:04


 


prednisone Allergy  Confusion Verified 07/03/21 14:04


 


cefdinir AdvReac  Diarrhea Verified 07/03/21 14:04











Home Meds: 


                                    Home Meds





Acetaminophen [Acetaminophen 8 Hour] 650 mg PO Q8HR PRN 08/22/16 [History]


Ascorbic Acid [Vitamin C] 1,000 mg PO DAILY 08/22/16 [History]


Cholecalciferol (Vitamin D3) [Vitamin D3] 5,000 units PO DAILY 08/22/16 

[History]


Omega-3/DHA/Epa/Fish Oil [Omega-3 Fish Oil 1,000 MG Sfgl] 2,000 mg PO DAILY 

08/22/16 [History]


Vitamin A 10,000 units PO DAILY 08/22/16 [History]


Vitamin E 400 units PO DAILY 08/22/16 [History]


Albuterol Sulfate [Proair Hfa] 1 - 2 puff IH Q4H PRN 08/21/19 [History]


Alendronate Sodium [Fosamax] 70 mg PO .WEEKLY 08/21/19 [History]


Diclofenac Sodium [Voltaren] 1 applic TOP QID 08/21/19 [History]


Thiamine [Vitamin B-1] 100 mg PO BEDTIME 08/21/19 [History]


hydroCHLOROthiazide [Hydrochlorothiazide] 25 mg PO DAILY 08/21/19 [History]


Hydroxychloroquine Sulfate [Plaquenil] 400 mg PO DAILY 05/26/20 [History]


Tiotropium [Spiriva HandiHaler] 1 puff INH DAILY 05/26/20 [History]


*Tumeric 2 tab PO DAILY 06/20/21 [History]


Calcium Carbonate [Calcium] 500 mg PO DAILY 06/20/21 [History]


Leflunomide 20 mg PO DAILY 06/20/21 [History]


lisinopriL [Prinivil] 10 mg PO DAILY #30 tab 06/20/21 [Rx]











Past Medical History


HEENT History: Reports: Hard of Hearing, Impaired Vision, Other (See Below)


Other HEENT History: endothelial corneal sclerosis,


Cardiovascular History: Reports: Hypertension, Other (See Below), Other (See 

Below)


Other Cardiovascular History: paroxysmal supraventricular tachycardia


Respiratory History: Reports: Bronchitis, Recurrent, COPD, Other (See Below)


Other Respiratory History: hemoptysis hx of.  three nodes in lungs


Gastrointestinal History: Reports: Colon Polyp


Genitourinary History: Reports: Urinary Incontinence


OB/GYN History: Reports: Pregnancy


Musculoskeletal History: Reports: Osteoporosis, RA


Other Musculoskeletal History: L knee pain


Psychiatric History: Reports: Depression


Hematologic History: Reports: Blood Transfusion(s)


Oncologic (Cancer) History: Reports: Breast





- Infectious Disease History


Infectious Disease History: Reports: Chicken Pox, Measles, Mumps, Scarlet Fever,

 Other (See Below)


Other Infectious Disease History: JAUNDICECOVID VACC  IN APRIL 2021





- Past Surgical History


Head Surgeries/Procedures: Reports: None


HEENT Surgical History: Reports: Oral Surgery


Cardiovascular Surgical History: Reports: Cardiac Ablation, Other (See Below)


Other Cardiovascular Surgeries/Procedures: ablation in 2001


Respiratory Surgical History: Reports: Other (See Below)


Other Respiratory Surgeries/Procedures: STEM CELL THERAPY ON LUNGS


GI Surgical History: Reports: Appendectomy, Colonoscopy


Female  Surgical History: Reports: Hysterectomy


Musculoskeletal Surgical History: Reports: None





Social & Family History





- Family History


Family Medical History: No Pertinent Family History





- Tobacco Use


Tobacco Use Status *Q: Never Tobacco User





- Caffeine Use


Caffeine Use: Reports: Coffee





- Recreational Drug Use


Recreational Drug Use: No





ED ROS GENERAL





- Review of Systems


Review Of Systems: See Below


Constitutional: Reports: Weakness, Fatigue.  Denies: Fever, Chills


Respiratory: Denies: Shortness of Breath, Wheezing


Cardiovascular: Reports: Blood Pressure Problem.  Denies: Chest Pain


GI/Abdominal: Denies: Abdominal Pain





ED EXAM, GENERAL





- Physical Exam


Exam: See Below


Exam Limited By: No Limitations


General Appearance: Alert, WD/WN, No Apparent Distress


Head: Atraumatic, Normocephalic


Neck: Normal Inspection, Supple, Non-Tender, Full Range of Motion.  No: 

Lymphadenopathy (R), Lymphadenopathy (L)


Respiratory/Chest: No Respiratory Distress, Lungs Clear, Normal Breath Sounds.  

No: Crackles, Rhonchi, Wheezing


Cardiovascular: Regular Rate, Rhythm, No Murmur, Other (no edema)


Peripheral Pulses: 2+: Posterior Tibial (L), Posterior Tibial (R), Dorsalis 

Pedis (L), Dorsalis Pedis (R)


Neurological: Alert, Oriented, Normal Cognition


Psychiatric: Normal Affect, Normal Mood


Skin Exam: Warm, Dry, Intact





Course





- Vital Signs


Last Recorded V/S: 


                                Last Vital Signs











Temp  36.3 C   07/03/21 14:09


 


Pulse  70   07/03/21 15:17


 


Resp  18   07/03/21 14:09


 


BP  182/65 H  07/03/21 15:17


 


Pulse Ox  92 L  07/03/21 15:17














- Orders/Labs/Meds


Labs: 


                                Laboratory Tests











  07/03/21 Range/Units





  14:25 


 


Potassium  3.5 L  (3.6-5.2)  mmol/L


 


Magnesium  1.9  (1.8-2.4)  mg/dL











Meds: 


Medications














Discontinued Medications














Generic Name Dose Route Start Last Admin





  Trade Name Babak  PRN Reason Stop Dose Admin


 


Clonidine HCl  0.1 mg  07/03/21 14:08  07/03/21 14:13





  Clonidine 0.1 Mg Tab  PO  07/03/21 14:09  0.1 mg





  ONETIME ONE   Administration














- Re-Assessments/Exams


Free Text/Narrative Re-Assessment/Exam: 





07/03/21 15:55


potassium has improved but not normalized.  Will continue to encourage potassium

 rich diet.  Will increase her Lisinopril to 20 mg daily will also she her home 

on clonidine 0.1 mg that she can use up to twice daily when her blood pressure 

above 150/90  





Departure





- Departure


Time of Disposition: 16:01


Disposition: Home, Self-Care 01


Condition: Good


Clinical Impression: 


HTN (hypertension)


Qualifiers:


 Hypertension type: unspecified Qualified Code(s): I10 - Essential (primary) 

hypertension





Instructions:  Hypertension, Adult


Referrals: 


Roselyn Jeffries MD [Primary Care Provider] - 


Forms:  ED Department Discharge


Additional Instructions: 


increase Lisinopril to 20 mg daily


use clonidine 0.1 mg up to twice daily if your blood pressure is greater then 

150/90


your potassium is improving 3 servings of potassium rich food daily


follow-up with our primary care doctor next week for a blood pressure recheck 





Sepsis Event Note (ED)





- Evaluation


Sepsis Screening Result: No Definite Risk





- Focused Exam


Vital Signs: 


                                   Vital Signs











  Temp Pulse Resp BP BP Pulse Ox


 


 07/03/21 15:17   70    182/65 H  92 L


 


 07/03/21 14:13     207/101 H  


 


 07/03/21 14:09  36.3 C  74  18   204/85 H  94 L


 


 07/03/21 14:03  36.3 C  74  18   204/85 H  94 L

## 2021-07-19 ENCOUNTER — HOSPITAL ENCOUNTER (OUTPATIENT)
Dept: HOSPITAL 11 - JP.SDS | Age: 71
LOS: 1 days | Discharge: HOME HEALTH | End: 2021-07-20
Attending: SURGERY
Payer: MEDICARE

## 2021-07-19 DIAGNOSIS — M81.0: ICD-10-CM

## 2021-07-19 DIAGNOSIS — I47.2: ICD-10-CM

## 2021-07-19 DIAGNOSIS — I10: ICD-10-CM

## 2021-07-19 DIAGNOSIS — C50.411: Primary | ICD-10-CM

## 2021-07-19 DIAGNOSIS — M10.9: ICD-10-CM

## 2021-07-19 DIAGNOSIS — Z17.0: ICD-10-CM

## 2021-07-19 DIAGNOSIS — M06.9: ICD-10-CM

## 2021-07-19 DIAGNOSIS — M17.0: ICD-10-CM

## 2021-07-19 DIAGNOSIS — Z88.1: ICD-10-CM

## 2021-07-19 DIAGNOSIS — Z87.891: ICD-10-CM

## 2021-07-19 DIAGNOSIS — I95.9: ICD-10-CM

## 2021-07-19 DIAGNOSIS — J44.9: ICD-10-CM

## 2021-07-19 DIAGNOSIS — Z88.8: ICD-10-CM

## 2021-07-19 PROCEDURE — 76098 X-RAY EXAM SURGICAL SPECIMEN: CPT

## 2021-07-19 PROCEDURE — 93005 ELECTROCARDIOGRAM TRACING: CPT

## 2021-07-19 PROCEDURE — 76000 FLUOROSCOPY <1 HR PHYS/QHP: CPT

## 2021-07-19 PROCEDURE — 19301 PARTIAL MASTECTOMY: CPT

## 2021-07-19 PROCEDURE — 38900 IO MAP OF SENT LYMPH NODE: CPT

## 2021-07-19 PROCEDURE — 80053 COMPREHEN METABOLIC PANEL: CPT

## 2021-07-19 PROCEDURE — 84100 ASSAY OF PHOSPHORUS: CPT

## 2021-07-19 PROCEDURE — 94640 AIRWAY INHALATION TREATMENT: CPT

## 2021-07-19 PROCEDURE — 94762 N-INVAS EAR/PLS OXIMTRY CONT: CPT

## 2021-07-19 PROCEDURE — 38500 BIOPSY/REMOVAL LYMPH NODES: CPT

## 2021-07-19 PROCEDURE — 83735 ASSAY OF MAGNESIUM: CPT

## 2021-07-19 PROCEDURE — 19281 PERQ DEVICE BREAST 1ST IMAG: CPT

## 2021-07-19 PROCEDURE — 86300 IMMUNOASSAY TUMOR CA 15-3: CPT

## 2021-07-19 PROCEDURE — 36415 COLL VENOUS BLD VENIPUNCTURE: CPT

## 2021-07-19 NOTE — MY
Surgical Specimen Breast

 

CLINICAL HISTORY: Right upper lumpectomy

 

FINDINGS: The breast specimen contains the Kopan localization wire and the

biopsy marker. The associated irregular shaped nodule is also seen

 

IMPRESSION: Surgical specimen contains the localization wire, biopsy marker and

the described lesion

## 2021-07-19 NOTE — MY
Guide Ndl Wire Loc RT

 

CLINICAL HISTORY: Carcinoma upper right breast

 

FINDINGS: Following informed consent patient was ultrasounded. The previously

described and biopsied lesion in the upper breast the as well as the marker are

not definitively able to be localized. Patient was switched to tomographic

mammogram localization. Patient was placed in the localization grid.

Craniocaudal mammogram was performed. Nodular lesion and hookwire were

identified. Depth was calculated by tomosynthesis. Patient was prepped. 1%

Xylocaine local anesthesia was stilled into the skin and subcutaneous tissue. A

Kopan needle was advanced to but was felt to be the appropriate depth just past

the lesion. The needle was removed leaving the wire marker in place. Medial

lateral mammogram shows the wire to be contiguous to the anterior aspect of the

lesion and within 1 mm of the marker

Patient was sent to the OR. No comp occasions were encountered.

 

 

IMPRESSION: Successful mammographic guided placement of a localization wire

contiguous to the anterior margin of the previously described and biopsied right

upper breast mass

## 2021-07-20 NOTE — DISCH
ADMISSION DIAGNOSES:

1. Malignant neoplasm, right breast.

2. Chronic obstructive pulmonary disease.

3. Osteoarthritis, knees.

4. Rheumatoid arthritis.

5. Hypotension.

6. Paroxysmal supraventricular tachycardia.

 

DISCHARGE DIAGNOSIS:  Right breast lumpectomy with sentinel lymph node biopsy for right

breast carcinoma.  Date of procedure; 07/19/2021.

 

HISTORY:  Jeny Wyatt is a pleasant 71-year-old female with a right breast lump.  After

preoperative evaluation and discussion of possible risks and possible complications, she

wishes to proceed with surgical procedure.

 

HOSPITAL COURSE:  Jeny had her surgery on 07/19/2021.  She had no operative complications.

On postoperative day #1, vital signs were stable.  Pain was controlled with Tylenol.  ANGELIA

drain instructions were given and the patient demonstrated her ability to just strip and

drain the ANGELIA drain, and she will be discharged to home in stable condition with home health

care.

 

PHYSICAL EXAMINATION:

GENERAL:  Jeny is a pleasant 71-year-old female.

VITAL SIGNS:  Height is 5 feet 3.25 inches, weight is 152 pounds.  TPR 98, 59, 16, blood

pressure 152/59.

HEENT:  Negative.

NECK:  Supple.

HEART:  Regular rate and rhythm.

LUNGS:  Clear.

BREASTS:  There is a pressure dressing on right lumpectomy incision and there is a Kerlix

wrap around her upper chest.  ANGELIA drain is intact draining a dark red drainage 50 mL over the

past 24 hours.

ABDOMEN:  Soft, nontender.

EXTREMITIES:  Without peripheral edema.

 

DISPOSITION:  Discharged to home with home health care.

 

CONDITION:  Stable.

 

HOME MEDICATIONS:  Resume home medications.  Take Tylenol 650 mg q.6 hours scheduled for

pain.

 

FOLLOWUP APPOINTMENT:  With Freddie Grimes MD, on 07/28/2021 at 1 p.m.  She already has an

Oncology appointment set for 07/28/2021 at 2:30 p.m.

 

DIET:  Usual diet as tolerated.  Drink 8 to 10 glasses of water a day.

 

ACTIVITY:  As tolerated.

 

DISCHARGE INSTRUCTIONS:  May shower.  Keep operative site clean and dry.  Take off dressing

on Thursday, 07/22/2021, and you can cover the incision and staples with gauze, wear

whatever is comfortable.  Notify provider if any fever, increased pain, swelling, redness,

drainage, nausea, or vomiting.

 

SPECIAL INSTRUCTIONS:

1. Strip, empty, measure, and record ANGELIA drain 4 times a day.

2. Bring record of drainage to clinic appointments.

3. Use incentive spirometer 10 times every hour while awake for 1 week.

 

DD:  07/20/2021 08:07:03

DT:  07/20/2021 09:44:21

Job #:  [id number]

## 2021-07-20 NOTE — PCM.EKG
** #1 Interpretation


EKG Date: 07/19/21


Time: 08:50


Rhythm: NSR


Rate (Beats/Min): 88


Axis: LAD-Left Axis Deviation


P-Wave: Present


QRS: Other (Probable old inferior wall myocardial infarction)


ST-T: Normal


QT: Normal


Comparison: NA - No Prior EKG

## 2021-09-27 ENCOUNTER — HOSPITAL ENCOUNTER (EMERGENCY)
Dept: HOSPITAL 11 - JP.ED | Age: 71
Discharge: LEFT BEFORE DISCHARGE | End: 2021-09-27
Payer: MEDICARE

## 2021-09-27 DIAGNOSIS — Z53.21: Primary | ICD-10-CM

## 2022-05-18 NOTE — OR
DATE OF PROCEDURE:  07/19/2021

 

SURGEON:  Freddie Grimes MD

 

PREOPERATIVE DIAGNOSIS:  Carcinoma of the right breast.

 

POSTOPERATIVE DIAGNOSIS:  Carcinoma of the right breast.

 

PROCEDURE:

1. Right partial mastectomy (lumpectomy) with sentinel lymph node biopsy (19302).

2. Injection procedure for identification of sentinel lymph nodes (75118).

 

ANESTHESIA:  General.

 

ASSISTANT:  Jenny Gillette PA-C.

 

INDICATION FOR PROCEDURE:  A 71-year-old female presenting with a roughly 1 cm infiltrating

ductal carcinoma with positive ER, IA, and HER2 measurements preoperatively.  The patient

had been seen the Medical Oncology and it was felt that a lumpectomy with sentinel node

biopsy would be an appropriate procedure and also explained the patient the need for

radiation treatment.  Potential risks of the procedure including bleeding, infection,

possible positive margins requiring additional resection as well as the possibility of final

pathologic finding such as positive lymph nodes that might require postoperative radiation

treatment were gone over, and the patient wishes to proceed.  With the patient's tumor being

quite small and appearing to be both clinically and also radiologically localized __________

was felt not to be necessary.

 

DETAILS OF PROCEDURE:  The patient was taken to the operating room and after general

endotracheal anesthesia was induced, some dye was placed in the area of the upper outer

quadrant based on the location of the preoperative localizing wire at least to facilitate a

more adequate control of the intraoperative margins.  4 mL of isosulfan blue dye was

injected and at that point, the breast, axilla, and the surrounding areas on the right side

were prepped and draped.

 

An elliptical incision overlying the wire with some fluoroscopic surveillance to facilitate

adequate directionality of the underlying dissection was made and carried down through the

skin and subcutaneous tissue and the tissue around the tumor and localizing wire __________

with primarily electrocautery and to some extent sharp dissection.  Using fluoroscopy

throughout the procedure, we were able to maintain what appeared to be satisfactory margins

away from the tumor, and at that point, the lumpectomy had been completed.  The margins were

marked with sutures to facilitate the pathologic examination.  The extent of the dissection

was fairly broad and it was felt that shave margins in this case would not be necessary.

 

The dissection then continued posteriorly with extension of incision over the axilla.

Series of lymph nodes were then encountered.  Two of these had some dye within them and the

other two were somewhat large in the area adjacent to that, and all of these were sent for

pathologic exam.  Frozen section of the nodes showed no evidence of metastatic disease.  At

that point, a Venu-Timmons drain was then placed through a stab wound inferior to the main

incision and incision was then closed with layers of 3-0 and 4-0 Vicryl stitch deep and then

staples for the skin.  Drain was affixed with some 4-0 Vicryl stitch.  The patient was taken

to the recovery room in satisfactory condition.  There were no evident complications.

 

Physician assistant, Jenny Gillette, played an essential role in assisting in this case,

helping to position the patient, retract structures as needed, as well as suturing and

cutting sutures when indicated.  Her presence improved patient's safety and decreased the

operative time.

 

 

 

 

Freddie Grimes MD

DD:  07/24/2021 16:12:39

DT:  07/25/2021 14:47:45

Job #:  [id number]
Ambulatory

## 2022-09-17 ENCOUNTER — APPOINTMENT (OUTPATIENT)
Dept: GENERAL RADIOLOGY | Facility: OTHER | Age: 72
End: 2022-09-17
Attending: EMERGENCY MEDICINE
Payer: COMMERCIAL

## 2022-09-17 ENCOUNTER — HOSPITAL ENCOUNTER (EMERGENCY)
Facility: OTHER | Age: 72
Discharge: HOME | End: 2022-09-17
Attending: EMERGENCY MEDICINE | Admitting: EMERGENCY MEDICINE
Payer: COMMERCIAL

## 2022-09-17 VITALS
TEMPERATURE: 97.3 F | SYSTOLIC BLOOD PRESSURE: 127 MMHG | HEIGHT: 64 IN | RESPIRATION RATE: 12 BRPM | DIASTOLIC BLOOD PRESSURE: 63 MMHG | BODY MASS INDEX: 26.87 KG/M2 | OXYGEN SATURATION: 91 % | HEART RATE: 95 BPM | WEIGHT: 157.41 LBS

## 2022-09-17 DIAGNOSIS — I47.10 SVT (SUPRAVENTRICULAR TACHYCARDIA) (H): ICD-10-CM

## 2022-09-17 LAB
ALBUMIN SERPL-MCNC: 4.1 G/DL (ref 3.5–5.7)
ALP SERPL-CCNC: 57 U/L (ref 34–104)
ALT SERPL W P-5'-P-CCNC: 13 U/L (ref 7–52)
ANION GAP SERPL CALCULATED.3IONS-SCNC: 15 MMOL/L (ref 3–14)
APTT PPP: 22 SECONDS (ref 22–38)
AST SERPL W P-5'-P-CCNC: 19 U/L (ref 13–39)
BASOPHILS # BLD AUTO: 0.1 10E3/UL (ref 0–0.2)
BASOPHILS NFR BLD AUTO: 1 %
BILIRUB SERPL-MCNC: 0.7 MG/DL (ref 0.3–1)
BUN SERPL-MCNC: 20 MG/DL (ref 7–25)
CALCIUM SERPL-MCNC: 9.4 MG/DL (ref 8.6–10.3)
CHLORIDE BLD-SCNC: 101 MMOL/L (ref 98–107)
CO2 SERPL-SCNC: 21 MMOL/L (ref 21–31)
CREAT SERPL-MCNC: 1.12 MG/DL (ref 0.6–1.2)
EOSINOPHIL # BLD AUTO: 0.1 10E3/UL (ref 0–0.7)
EOSINOPHIL NFR BLD AUTO: 2 %
ERYTHROCYTE [DISTWIDTH] IN BLOOD BY AUTOMATED COUNT: 15.2 % (ref 10–15)
GFR SERPL CREATININE-BSD FRML MDRD: 51 ML/MIN/1.73M2
GLUCOSE BLD-MCNC: 141 MG/DL (ref 70–105)
HCT VFR BLD AUTO: 37.1 % (ref 35–47)
HGB BLD-MCNC: 12.6 G/DL (ref 11.7–15.7)
HOLD SPECIMEN: NORMAL
HOLD SPECIMEN: NORMAL
IMM GRANULOCYTES # BLD: 0 10E3/UL
IMM GRANULOCYTES NFR BLD: 0 %
INR PPP: 1.08 (ref 0.85–1.15)
LYMPHOCYTES # BLD AUTO: 1.7 10E3/UL (ref 0.8–5.3)
LYMPHOCYTES NFR BLD AUTO: 23 %
MCH RBC QN AUTO: 27.8 PG (ref 26.5–33)
MCHC RBC AUTO-ENTMCNC: 34 G/DL (ref 31.5–36.5)
MCV RBC AUTO: 82 FL (ref 78–100)
MONOCYTES # BLD AUTO: 0.7 10E3/UL (ref 0–1.3)
MONOCYTES NFR BLD AUTO: 10 %
NEUTROPHILS # BLD AUTO: 4.6 10E3/UL (ref 1.6–8.3)
NEUTROPHILS NFR BLD AUTO: 64 %
NRBC # BLD AUTO: 0 10E3/UL
NRBC BLD AUTO-RTO: 0 /100
NT-PROBNP SERPL-MCNC: 418 PG/ML (ref 0–100)
PLATELET # BLD AUTO: 268 10E3/UL (ref 150–450)
POTASSIUM BLD-SCNC: 3.5 MMOL/L (ref 3.5–5.1)
PROT SERPL-MCNC: 7.5 G/DL (ref 6.4–8.9)
RBC # BLD AUTO: 4.53 10E6/UL (ref 3.8–5.2)
SODIUM SERPL-SCNC: 137 MMOL/L (ref 134–144)
TROPONIN I SERPL-MCNC: 37.2 PG/ML (ref 0–34)
WBC # BLD AUTO: 7.2 10E3/UL (ref 4–11)

## 2022-09-17 PROCEDURE — 85730 THROMBOPLASTIN TIME PARTIAL: CPT | Mod: GZ | Performed by: EMERGENCY MEDICINE

## 2022-09-17 PROCEDURE — 84484 ASSAY OF TROPONIN QUANT: CPT | Performed by: EMERGENCY MEDICINE

## 2022-09-17 PROCEDURE — 85025 COMPLETE CBC W/AUTO DIFF WBC: CPT | Performed by: EMERGENCY MEDICINE

## 2022-09-17 PROCEDURE — 250N000011 HC RX IP 250 OP 636: Performed by: EMERGENCY MEDICINE

## 2022-09-17 PROCEDURE — 36415 COLL VENOUS BLD VENIPUNCTURE: CPT | Performed by: EMERGENCY MEDICINE

## 2022-09-17 PROCEDURE — 80053 COMPREHEN METABOLIC PANEL: CPT | Performed by: EMERGENCY MEDICINE

## 2022-09-17 PROCEDURE — 99284 EMERGENCY DEPT VISIT MOD MDM: CPT | Performed by: EMERGENCY MEDICINE

## 2022-09-17 PROCEDURE — 250N000013 HC RX MED GY IP 250 OP 250 PS 637: Performed by: EMERGENCY MEDICINE

## 2022-09-17 PROCEDURE — 83880 ASSAY OF NATRIURETIC PEPTIDE: CPT | Performed by: EMERGENCY MEDICINE

## 2022-09-17 PROCEDURE — 93010 ELECTROCARDIOGRAM REPORT: CPT | Performed by: INTERNAL MEDICINE

## 2022-09-17 PROCEDURE — 71045 X-RAY EXAM CHEST 1 VIEW: CPT

## 2022-09-17 PROCEDURE — 93005 ELECTROCARDIOGRAM TRACING: CPT | Performed by: EMERGENCY MEDICINE

## 2022-09-17 PROCEDURE — 85610 PROTHROMBIN TIME: CPT | Performed by: EMERGENCY MEDICINE

## 2022-09-17 PROCEDURE — 93005 ELECTROCARDIOGRAM TRACING: CPT | Mod: 76 | Performed by: EMERGENCY MEDICINE

## 2022-09-17 PROCEDURE — 99285 EMERGENCY DEPT VISIT HI MDM: CPT | Mod: 25 | Performed by: EMERGENCY MEDICINE

## 2022-09-17 PROCEDURE — 96374 THER/PROPH/DIAG INJ IV PUSH: CPT | Performed by: EMERGENCY MEDICINE

## 2022-09-17 RX ORDER — METOPROLOL TARTRATE 1 MG/ML
5 INJECTION, SOLUTION INTRAVENOUS EVERY 5 MIN PRN
Status: DISCONTINUED | OUTPATIENT
Start: 2022-09-17 | End: 2022-09-17

## 2022-09-17 RX ORDER — CARVEDILOL 3.12 MG/1
3.12 TABLET ORAL ONCE
Status: COMPLETED | OUTPATIENT
Start: 2022-09-17 | End: 2022-09-17

## 2022-09-17 RX ORDER — ADENOSINE 3 MG/ML
12 INJECTION, SOLUTION INTRAVENOUS ONCE
Status: COMPLETED | OUTPATIENT
Start: 2022-09-17 | End: 2022-09-17

## 2022-09-17 RX ADMIN — CARVEDILOL 3.12 MG: 3.12 TABLET, FILM COATED ORAL at 16:26

## 2022-09-17 RX ADMIN — ADENOSINE 12 MG: 3 INJECTION, SOLUTION INTRAVENOUS at 15:03

## 2022-09-17 ASSESSMENT — ACTIVITIES OF DAILY LIVING (ADL)
ADLS_ACUITY_SCORE: 35
ADLS_ACUITY_SCORE: 33

## 2022-09-17 NOTE — ED PROVIDER NOTES
"[facility]  Emergency Department Visit Note    Tachycardia      History of Present Illness     HPI:  Maddie Webber is a 72 year old female presenting with palpitations. These started 15 minutes ago and are ongoing. The patient has had similar palpitation symptoms in the past and had an ablation 10 years ago for SVT. She has been having increasing recurrences of SVT since undergoing chemotherapy for breast cancer. While palpitations are present, the patient had no chest pain, shortness of breath, fever, nausea. The patient states that there has been no recent medication change, change in caffeine intake, or use of recreational drugs. No recent illness or fevers.     Medications:  Prior to Admission medications    Not on File       Allergies:  Allergies   Allergen Reactions     Azithromycin Shortness Of Breath, Other (See Comments) and Rash     Prednisone Other (See Comments)     Other reaction(s): Confusion     Cefdinir Diarrhea     Lisinopril Other (See Comments)     Possibly facial swelling  Possibly facial swelling         Problem List:  There is no problem list on file for this patient.      Past Medical History:  No past medical history on file.    Past Surgical History:  No past surgical history on file.    Social History:       Review of Systems:  10 point review of systems obtained and pertinent positive and negative findings noted in HPI. Review of systems otherwise negative.      Physical Exam     Vital signs: /63   Pulse 95   Temp 97.3  F (36.3  C) (Tympanic)   Resp 12   Ht 1.626 m (5' 4\")   Wt 71.4 kg (157 lb 6.5 oz)   SpO2 91%   BMI 27.02 kg/m      Physical Exam:    General: awake and alert, comfortable  HEENT: atraumatic, no scleral injection, no nasal discharge, neck supple  Chest: clear to auscultation bilaterally without wheezes or crackles, non labored respirations, symmetric chest rise  Cardiovascular: tachycardic regular rate and rhythm, no murmurs or " gallops  Abdomen: soft, nontender, no rebound or guarding, nondistended  Extremities: no deformities, edema, or tenderness  Skin: warm, dry, no rashes  Neuro: alert and oriented x 3, moving extremities x 4, ambulates without difficulty    EKG: tachycardic supraventricular tachycardia without normal without acute ischemic changes    Medical Decision Making & ED Course     Maddie Webber is a 72 year old female presenting with palpitations. Differential includes preventricular contractions, supraventricular tachycardia, atrial fibrillation, atrial flutter, Omar-Parkinson White, panic attack, generalized anxiety disorder, acute coronary syndrome, pulmonary embolism. Initial EKG revealed supraventricular tachycardia. This was treated with adenosine with resolution of supraventricular tachycardia and subsequent EKG revealed normal sinus rhythm without acute ischemic changes. History does not reveal a clear etiology or precipitating factor that would explain this episode of supraventricular tachycardia. Laboratory studies obtained to attempt to reveal a cause of supraventricular tachycardia but are unrevealing. The patient has been evaluated for supraventricular tachycardia in the past. Given the patient's age, history of present illness, and past medical history, there is a low suspicion of pulmonary embolism or acute coronary syndrome and the patient does not require further diagnostic testing at this time. Discussed with the patient that supraventricular tachycardia may recur and, if frequent, cardiology consultation is recommended to consider ablation. She is stable for further outpatient management. Patient given instructions on follow-up and warning signs for which to return to ED. All questions were answered and the patient is comfortable with plan for discharge. The patient was discharged in stable condition.     I have reviewed the patients ECG, laboratory studies, imaging, and medical  records.  .    Diagnosis & Disposition     Diagnosis:  1. SVT (supraventricular tachycardia) (H)        Disposition:  Home    MD Verna Meyer Theresa M, MD  09/18/22 [id number]

## 2022-09-19 LAB
ATRIAL RATE - MUSE: 174 BPM
ATRIAL RATE - MUSE: 90 BPM
DIASTOLIC BLOOD PRESSURE - MUSE: NORMAL MMHG
DIASTOLIC BLOOD PRESSURE - MUSE: NORMAL MMHG
INTERPRETATION ECG - MUSE: NORMAL
INTERPRETATION ECG - MUSE: NORMAL
P AXIS - MUSE: 54 DEGREES
P AXIS - MUSE: NORMAL DEGREES
PR INTERVAL - MUSE: 182 MS
PR INTERVAL - MUSE: NORMAL MS
QRS DURATION - MUSE: 74 MS
QRS DURATION - MUSE: 88 MS
QT - MUSE: 254 MS
QT - MUSE: 394 MS
QTC - MUSE: 441 MS
QTC - MUSE: 481 MS
R AXIS - MUSE: -35 DEGREES
R AXIS - MUSE: -36 DEGREES
SYSTOLIC BLOOD PRESSURE - MUSE: NORMAL MMHG
SYSTOLIC BLOOD PRESSURE - MUSE: NORMAL MMHG
T AXIS - MUSE: 153 DEGREES
T AXIS - MUSE: 78 DEGREES
VENTRICULAR RATE- MUSE: 182 BPM
VENTRICULAR RATE- MUSE: 90 BPM

## (undated) RX ORDER — ADENOSINE 3 MG/ML
INJECTION, SOLUTION INTRAVENOUS
Status: DISPENSED
Start: 2022-09-17